# Patient Record
(demographics unavailable — no encounter records)

---

## 2025-03-15 NOTE — PHYSICAL EXAM
[Alert] : alert [No Acute Distress] : no acute distress [Conjunctivae with no discharge] : conjunctivae with no discharge [Auricles Well Formed] : auricles well formed [Clear Tympanic membranes with present light reflex and bony landmarks] : clear tympanic membranes with present light reflex and bony landmarks [Nares Patent] : nares patent [Supple, full passive range of motion] : supple, full passive range of motion [Symmetric Chest Rise] : symmetric chest rise [Clear to Auscultation Bilaterally] : clear to auscultation bilaterally [Regular Rate and Rhythm] : regular rate and rhythm [Normal S1, S2 present] : normal S1, S2 present [Soft] : soft [NonTender] : non tender [Testicles Descended Bilaterally] : testicles descended bilaterally [No pain or deformities with palpation of bone, muscles, joints] : no pain or deformities with palpation of bone, muscles, joints [Normal Muscle Tone] : normal muscle tone [Straight] : straight [Cranial Nerves Grossly Intact] : cranial nerves grossly intact [No Rash or Lesions] : no rash or lesions
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
## 2025-03-17 NOTE — REVIEW OF SYSTEMS
[Fever] : no fever [Headache] : no headache [Eye Pain] : no eye pain [Ear Pain] : no ear pain [Wheezing] : no wheezing [Cough] : no cough [Nausea] : no nausea [Vomiting] : no vomiting [Diarrhea] : no diarrhea [Restriction of Motion] : no restriction of motion [Swelling of Joint] : no swelling of joint [Redness of Joint] : no redness of joint [Easy Bruising] : no tendency for easy bruising [Bleeding Gums] : no bleeding gums [Dysuria] : no dysuria
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
